# Patient Record
Sex: MALE | Race: WHITE | Employment: STUDENT | URBAN - METROPOLITAN AREA
[De-identification: names, ages, dates, MRNs, and addresses within clinical notes are randomized per-mention and may not be internally consistent; named-entity substitution may affect disease eponyms.]

---

## 2024-02-20 PROCEDURE — 99283 EMERGENCY DEPT VISIT LOW MDM: CPT

## 2024-02-20 PROCEDURE — 12001 RPR S/N/AX/GEN/TRNK 2.5CM/<: CPT

## 2024-02-21 ENCOUNTER — HOSPITAL ENCOUNTER (EMERGENCY)
Facility: HOSPITAL | Age: 19
Discharge: HOME | End: 2024-02-21
Attending: EMERGENCY MEDICINE
Payer: COMMERCIAL

## 2024-02-21 VITALS
HEIGHT: 68 IN | BODY MASS INDEX: 18.94 KG/M2 | DIASTOLIC BLOOD PRESSURE: 78 MMHG | WEIGHT: 125 LBS | RESPIRATION RATE: 18 BRPM | OXYGEN SATURATION: 98 % | HEART RATE: 88 BPM | SYSTOLIC BLOOD PRESSURE: 120 MMHG | TEMPERATURE: 99 F

## 2024-02-21 DIAGNOSIS — S61.012A LACERATION OF LEFT THUMB WITHOUT FOREIGN BODY WITHOUT DAMAGE TO NAIL, INITIAL ENCOUNTER: Primary | ICD-10-CM

## 2024-02-21 RX ORDER — LIDOCAINE HYDROCHLORIDE 10 MG/ML
20 INJECTION, SOLUTION EPIDURAL; INFILTRATION; INTRACAUDAL; PERINEURAL ONCE
Status: COMPLETED | OUTPATIENT
Start: 2024-02-21 | End: 2024-02-21

## 2024-02-21 NOTE — DISCHARGE INSTRUCTIONS
You should keep your laceration clean and dry for 48 hours and then washing it twice a day with a damp soapy towel until you have the sutures removed. Your stitches need to come out in 7 days. You should arrange to have this done your primary care doctor's office or can go to urgent care to have them out   Please come back right away for severe pain, changes speech or vision, numbness, tingling, weakness redness warmth swelling or discharge around the wound or any other concerns

## 2024-02-21 NOTE — ED INITIAL ASSESSMENT (HPI)
States that he cut the Lt thumb on a knife 30  ago.  No active bleeding. Has Lac to the Distal tip of the thumb. Good ROM. Sensation intact. No FB noted

## 2024-02-21 NOTE — ED PROVIDER NOTES
Patient Seen in: [facility] Emergency Department    History     Chief Complaint   Patient presents with    Laceration       HPI    18-year-old male who was playing with a knife trying to open a plastic water bottle and accidentally  cut his left thumb, tetanus up-to-date.  Denies any weakness or numbness    History reviewed. History reviewed. No pertinent past medical history.    History reviewed. History reviewed. No pertinent surgical history.      Medications :  (Not in a hospital admission)       No family history on file.    Smoking Status:   Social History     Socioeconomic History    Marital status: Single       Constitutional and vital signs reviewed.      Social History and Family History elements reviewed from today, pertinent positives to the presenting problem noted.    Physical Exam     ED Triage Vitals   BP 02/20/24 2348 119/72   Pulse 02/20/24 2348 88   Resp 02/20/24 2348 18   Temp 02/20/24 2348 98.9 °F (37.2 °C)   Temp src --    SpO2 02/20/24 2348 98 %   O2 Device 02/21/24 0155 None (Room air)       All measures to prevent infection transmission during my interaction with the patient were taken. The patient was already wearing a droplet mask on my arrival to the room. Personal protective equipment was worn throughout the duration of the exam.  Handwashing was performed prior to and after the exam.  Stethoscope and any equipment used during my examination was cleaned with super sani-cloth germicidal wipes following the exam.     Physical Exam    General: No acute distress  Head: Normocephalic and atraumatic.  Mouth/Throat/Ears/Nose: Oropharynx is clear and moist.   Eyes: Conjunctivae and EOM are normal.   Neck: Normal range of motion. Supple.   Cardiovascular: Normal rate, regular rhythm, normal heart sounds.  Respiratory/Chest: Clear and equal bilaterally. Exhibits no tenderness.  Gastrointestinal: Soft, non-tender, non-distended. Bowel sounds are normal.   Musculoskeletal: Left thumb 1.5 cm  curvilinear laceration on the distal phalanx, not involving the nailbed, 2+ radial pulse, sensation intact to light touch, normal A-OK and thumbs up signs, sensation intact throughout the left thumb  Neurological: Alert and appropriate. No focal deficits.   Skin: Skin is warm and dry. No pallor.  Psychiatric: Has a normal mood and affect.      ED Course      Labs Reviewed - No data to display    As Interpreted by me    Imaging Results Available and Reviewed while in ED: No results found.  ED Medications Administered:   Medications   lidocaine PF (Xylocaine-MPF) 1% injection (20 mL Infiltration Given by Other 2/21/24 0146)         MDM     Vitals:    02/20/24 2348 02/21/24 0155   BP: 119/72 120/78   Pulse: 88 88   Resp: 18 18   Temp: 98.9 °F (37.2 °C)    SpO2: 98% 98%   Weight: 56.7 kg    Height: 172.7 cm (5' 8\")      *I personally reviewed and interpreted all ED vitals.    Pulse Ox: 98%, Room air, Normal       Medical Decision Making      Differential Diagnosis/ Diagnostic Considerations: Thumb laceration    Complicating Factors: The patient already has does not have a problem list on file. to contribute to the complexity of this ED evaluation.    I reviewed prior chart records including ED visit note from March 11, 2023.  Patient here with left thumb laceration status post repair as below.  Discussed wound care instructions and follow-up instructions for the laceration     Discharged in stable condition.  Patient is comfortable with the plan.    Laceration repair Procedure note   Indication: 1.5 cm left thumb curvilinear distal phalanx laceration without nailbed involvement  Consent: obtained after discussion of risks, benefits, and alternatives  Preparation: extensive pressurized irrigation >500ml   Anesthesia: Lidocaine 1%, 3 mL digital block  Procedure: 5-0 Prolene X4 simple interrupted sutures with appropriate approximation  Complications: None    Disposition and Plan     Clinical Impression:  1. Laceration of  left thumb without foreign body without damage to nail, initial encounter        Disposition:  Discharge    Follow-up:  Vasquez Pino MD  [address] [facility]  [address]  [facility] [address] [phone]    Follow up in 1 week(s)  For suture removal      Medications Prescribed:  There are no discharge medications for this patient.

## 2024-02-28 ENCOUNTER — HOSPITAL ENCOUNTER (OUTPATIENT)
Age: 19
Discharge: HOME | End: 2024-02-28
Payer: COMMERCIAL

## 2024-02-28 VITALS
HEART RATE: 66 BPM | SYSTOLIC BLOOD PRESSURE: 105 MMHG | RESPIRATION RATE: 20 BRPM | BODY MASS INDEX: 19.7 KG/M2 | DIASTOLIC BLOOD PRESSURE: 50 MMHG | OXYGEN SATURATION: 98 % | TEMPERATURE: 97 F | WEIGHT: 130 LBS | HEIGHT: 68 IN

## 2024-02-28 DIAGNOSIS — Z48.02 ENCOUNTER FOR REMOVAL OF SUTURES: Primary | ICD-10-CM

## 2024-02-28 PROCEDURE — 99024 POSTOP FOLLOW-UP VISIT: CPT | Performed by: NURSE PRACTITIONER

## 2024-02-28 NOTE — DISCHARGE INSTRUCTIONS
Please keep area clean and dry.  If you still continue to have numbness in the digit please follow-up with hand surgery as discussed.

## 2024-02-28 NOTE — ED PROVIDER NOTES
Patient Seen in: [facility]      History     Chief Complaint   Patient presents with    Suture Removal     Stated Complaint: suture removal    Subjective:   This is a 18-year-old male with no significant past medical history.  Presents to immediate care for suture removal from the left thumb.  4 sutures placed on February 21 at [place] [facility].  Patient denies any fevers.  No drainage from the wound.  Reports intermittent numbness at the tip of the digit.    The history is provided by the patient.           Objective:   No pertinent past medical history.            No pertinent past surgical history.              No pertinent social history.            Review of Systems   Constitutional:  Negative for chills and fever.   HENT:  Negative for congestion.    Respiratory:  Negative for cough and shortness of breath.    Cardiovascular:  Negative for chest pain.   Gastrointestinal:  Negative for abdominal pain.   Genitourinary:  Negative for dysuria.   Musculoskeletal:  Negative for back pain and neck pain.   Skin:  Positive for wound.   Allergic/Immunologic: Negative for environmental allergies.   Neurological:  Positive for numbness. Negative for headaches.   Hematological:  Does not bruise/bleed easily.       Positive for stated complaint: suture removal  Other systems are as noted in HPI.  Constitutional and vital signs reviewed.      All other systems reviewed and negative except as noted above.    Physical Exam     ED Triage Vitals [02/28/24 0935]   /50   Pulse 66   Resp 20   Temp 96.9 °F (36.1 °C)   Temp src Temporal   SpO2 98 %   O2 Device None (Room air)       Current:/50   Pulse 66   Temp 96.9 °F (36.1 °C) (Temporal)   Resp 20   Ht 172.7 cm (5' 8\")   Wt 59 kg   SpO2 98%   BMI 19.77 kg/m²         Physical Exam  Vitals and nursing note reviewed.   Constitutional:       General: He is not in acute distress.     Appearance: Normal appearance. He is normal weight. He is not  ill-appearing, toxic-appearing or diaphoretic.   HENT:      Head: Normocephalic and atraumatic.      Right Ear: External ear normal.      Left Ear: External ear normal.      Nose: Nose normal.      Mouth/Throat:      Mouth: Mucous membranes are moist.      Pharynx: Oropharynx is clear.   Eyes:      General:         Right eye: No discharge.         Left eye: No discharge.      Extraocular Movements: Extraocular movements intact.      Conjunctiva/sclera: Conjunctivae normal.   Cardiovascular:      Rate and Rhythm: Normal rate.   Pulmonary:      Effort: Pulmonary effort is normal.   Musculoskeletal:      Left hand: Laceration present. No swelling, deformity, tenderness or bony tenderness. Normal range of motion. Normal strength. There is no disruption of two-point discrimination. Normal capillary refill. Normal pulse.      Cervical back: Neck supple.      Right lower leg: No edema.      Left lower leg: No edema.      Comments: 4 sutures in place at the tip of the left thumb.  No drainage or erythema.   Skin:     General: Skin is warm and dry.      Capillary Refill: Capillary refill takes less than 2 seconds.      Findings: No rash.   Neurological:      Mental Status: He is alert and oriented to person, place, and time.   Psychiatric:         Mood and Affect: Mood normal.         Behavior: Behavior normal.               ED Course   Labs Reviewed - No data to display          Suture removal         MDM      Vital signs stable.  Patient is well-appearing and nontoxic looking.  Presents to immediate care for suture removal from the left thumb.    Wound appears to be healing well with no evidence of infection.  Patient reports intermittent numbness at the tip of the thumb.  Distal CMS intact on exam.  4 sutures removed.  Patient tolerated well.      DC home.  Wound care including signs of infection discussed in detail.  We discussed if he still continues to have intermittent numbness in the tip of the digit he should  follow-up with hand surgery.    Reasons to return reviewed.  He verbalized understanding, and agreed with plan of care.  All questions answered.                                   Medical Decision Making      Disposition and Plan     Clinical Impression:  1. Encounter for removal of sutures         Disposition:  Discharge  2/28/2024 10:18 am    Follow-up:  Latonya Cardona PA  [address] [place] [id number]  [phone]    In 1 week            Medications Prescribed:  There are no discharge medications for this patient.

## 2024-05-09 ENCOUNTER — IMAGING SERVICES (OUTPATIENT)
Dept: GENERAL RADIOLOGY | Age: 19
End: 2024-05-09
Attending: PHYSICIAN ASSISTANT

## 2024-05-09 DIAGNOSIS — Z02.1 PRE-EMPLOYMENT EXAMINATION: ICD-10-CM

## 2024-05-09 DIAGNOSIS — Z02.1 PRE-EMPLOYMENT EXAMINATION: Primary | ICD-10-CM

## 2024-05-09 PROCEDURE — 72100 X-RAY EXAM L-S SPINE 2/3 VWS: CPT | Performed by: RADIOLOGY

## (undated) NOTE — LETTER
Date & Time: 2/21/2024, 2:27 AM  Patient: Rian Jay  Encounter Provider(s):    Tana Cash MD       To Whom It May Concern:    Rian Jay was seen and treated in our department on 2/20/2024. He should not return to work until 2/28/2024 .    If you have any questions or concerns, please do not hesitate to call.        _____________________________  Physician/APC Signature